# Patient Record
Sex: MALE | URBAN - METROPOLITAN AREA
[De-identification: names, ages, dates, MRNs, and addresses within clinical notes are randomized per-mention and may not be internally consistent; named-entity substitution may affect disease eponyms.]

---

## 2024-11-21 ENCOUNTER — EMERGENCY (EMERGENCY)
Facility: HOSPITAL | Age: 29
LOS: 0 days | Discharge: HOME | End: 2024-11-21
Attending: EMERGENCY MEDICINE
Payer: COMMERCIAL

## 2024-11-21 VITALS
TEMPERATURE: 99 F | RESPIRATION RATE: 18 BRPM | OXYGEN SATURATION: 100 % | DIASTOLIC BLOOD PRESSURE: 90 MMHG | WEIGHT: 148.59 LBS | HEART RATE: 94 BPM | SYSTOLIC BLOOD PRESSURE: 143 MMHG

## 2024-11-21 DIAGNOSIS — R06.02 SHORTNESS OF BREATH: ICD-10-CM

## 2024-11-21 DIAGNOSIS — F41.9 ANXIETY DISORDER, UNSPECIFIED: ICD-10-CM

## 2024-11-21 DIAGNOSIS — R00.2 PALPITATIONS: ICD-10-CM

## 2024-11-21 PROCEDURE — 99284 EMERGENCY DEPT VISIT MOD MDM: CPT

## 2024-11-21 PROCEDURE — 93010 ELECTROCARDIOGRAM REPORT: CPT

## 2024-11-21 PROCEDURE — 71046 X-RAY EXAM CHEST 2 VIEWS: CPT | Mod: 26

## 2024-11-21 PROCEDURE — 93005 ELECTROCARDIOGRAM TRACING: CPT

## 2024-11-21 PROCEDURE — 71046 X-RAY EXAM CHEST 2 VIEWS: CPT

## 2024-11-21 PROCEDURE — 99283 EMERGENCY DEPT VISIT LOW MDM: CPT | Mod: 25

## 2024-11-21 RX ORDER — HYDROXYZINE HCL 25 MG
1 TABLET ORAL
Qty: 42 | Refills: 0
Start: 2024-11-21 | End: 2024-12-11

## 2024-11-21 RX ORDER — HYDROXYZINE HCL 25 MG
25 TABLET ORAL ONCE
Refills: 0 | Status: COMPLETED | OUTPATIENT
Start: 2024-11-21 | End: 2024-11-21

## 2024-11-21 RX ADMIN — Medication 25 MILLIGRAM(S): at 12:31

## 2024-11-21 NOTE — ED ADULT TRIAGE NOTE - WEIGHT IN KG
Call placed to Lg to discuss need for ENT consult and scheduling of EGD.  Message left for Lg to return call.     67.4 IV discontinued, cath removed intact

## 2024-11-21 NOTE — ED PROVIDER NOTE - PATIENT PORTAL LINK FT
You can access the FollowMyHealth Patient Portal offered by [facility] by registering at the following website: [email or web address]. By joining [employer]’s FollowMyHealth portal, you will also be able to view your health information using other applications (apps) compatible with our system.

## 2024-11-21 NOTE — ED ADULT NURSE NOTE - OBJECTIVE STATEMENT
pt c/o feeling anxious and SOB, pt states he thinks he is having panic attacks, on and off x 1 week. as per Sister, family hx anxiety disorder. Denies SI/HI

## 2024-11-21 NOTE — ED PROVIDER NOTE - NSFOLLOWUPCLINICS_GEN_ALL_ED_FT
[facility]  Medicine  [address], [place]   Phone: [phone]  Fax:   Follow Up Time: 1-3 Days    [facility]  OP Mental Health  [address] [place] [address]  Phone: [phone]  Fax:   Follow Up Time: 1-3 Days

## 2024-11-21 NOTE — ED PROVIDER NOTE - CLINICAL SUMMARY MEDICAL DECISION MAKING FREE TEXT BOX
Patient presents with anxiety. SOB and palp during the episode which resolvd. normal ekg, cxr. Feeling better. Hydroxyzine given. Discharged with pmd and psych follow up. return precautions discussed.

## 2024-11-21 NOTE — ED PROVIDER NOTE - PHYSICAL EXAMINATION
GENERAL: Well-developed; well-nourished; in no acute distress.   SKIN: warm, dry  HEAD: Normocephalic; atraumatic.  EYES: 2mm pupils, PERRLA, EOMI, no conjunctival erythema  ENT: No nasal discharge; airway clear. MMM  NECK: Supple; non tender.  CARD: Regular rate and rhythm. S1, S2 normal; no murmurs, gallops, or rubs.   RESP: LCTAB; No wheezes, rales, rhonchi, or stridor.  ABD: soft, nontender, and nondistended  EXT: Normal ROM.  No LE TTP or edema bilaterally.  NEURO: A/ox3, grossly unremarkable  PSYCH: Cooperative, appropriate.

## 2024-11-21 NOTE — ED PROVIDER NOTE - NSFOLLOWUPINSTRUCTIONS_ED_ALL_ED_FT
Nuestros coordinadores de referencias del departamento de emergencias se comunicarán con usted en las próximas 24 a  48 horas de 9:00 a. m. a 5:00 p. m. (de lunes a viernes) con asad waleska de seguimiento. Espere asad llamada telefónica del hospital en baljit período de tiempo. Si no recibe asad llamada o si tiene alguna pregunta o inquietud, puede comunicarse con ellos al [phone].     Ansiedad    LO QUE NECESITA SABER:    ¿Qué necesito saber acerca de la ansiedad?La ansiedad es asad afección que provoca que usted se sienta extremadamente preocupado o nervioso. Los sentimientos son killian rita que pueden causar problemas en nataliya actividades diarias o el sueño. La ansiedad puede desencadenarse por algo que teme o puede ocurrir sin asad causa. El estrés familiar o laboral, fumar, la cafeína y el alcohol pueden aumentar morejon riesgo para sufrir ansiedad. Ciertos medicamentos o condiciones de edwin también pueden aumentar morejon riesgo. La ansiedad puede convertirse en asad afección de larga duración si no se controla ni se trata.    ¿Cuáles son los signos y síntomas de la ansiedad?    Fatiga o rigidez muscular    Temblores, inquietud o irritabilidad    Problemas para concentrarse    Dificultad para dormir    Sentirse asustadizo, azorado o mareado    Ritmo cardíaco acelerado o falta de aliento  ¿Cómo se diagnostica la ansiedad?Dígale a morejon médico cuándo comenzaron nataliya síntomas y qué los desencadena. Infórmele a morejon médico si la ansiedad afecta nataliya actividades diarias. Morejon médico también le preguntará sobre nataliya antecedentes médicos y si tiene familiares con asad condición similar. Informe a morejon médico acerca de morejon consumo actual y pasado de alcohol, nicotina o drogas. Cualquiera de estos puede empeorar la ansiedad.    ¿Cómo se trata la ansiedad?El tratamiento depende de la severidad de nataliya síntomas. Los siguientes son tratamientos comunes para la ansiedad:    La terapia cognitivo conductual (TCC)le enseña a identificar y cambiar patrones de pensamiento negativos.    Medicamentos para la ansiedad o antidepresivospueden ayudar a aliviar o prevenir la ansiedad. Es posible que usted deba lester los medicamentos por varias semanas antes de empezar a sentirse mejor. Informe a morejon médico sobre cualquier efecto secundario o problemas que usted presente con morejon medicamento. Podría ser necesario cambiar el tipo o cantidad de medicamento. Los medicamentos suelen utilizarse junto con la terapia.  ¿Qué puedo hacer para manejar mi ansiedad?    Hable con alguien sobre morejon ansiedad.Morejon médico puede sugerirle que reciba consejería. Usted podría sentirse más cómodo hablando con un familiar o amigo sobre morejon ansiedad. Elija a alguien que usted sepa que será comprensivo y alentador.    Realice actividad física regularmente.La actividad física puede reducir morejon estrés, mejorar morejon estado de ánimo y ayudarle a dormir mejor. Colabore con morejon médico para crear un plan de ejercicios que usted disfrute.  AALIYAH ASIÁTICA CAMINANDO JEFFERY EJERCICIO      Establezca un horario regular para dormir.Asad rutina puede ayudarlo a relajarse antes de irse a dormir. Escuche música, afia o stephen yoga. Trate de irse a dormir y despertarse al mismo tiempo todos los días. El sueño es importante para la edwin emocional.    Realice actividades que disfruta.Pase tiempo con amigos o stephen cosas divertidas. Elija actividades con las que esté familiarizado o que le resulten cómodas. [place] puede ayudar a prevenir la ansiedad.    Practique la respiración profunda.La respiración profunda puede ayudarlo a relajarse cuando se sienta ansioso. Enfóquese en respirar lento y profundo varias veces al día, o vilma un ataque de ansiedad. Respire por la nariz y exhale por la boca. La respiración profunda combinada con la meditación o escuchar música puede ayudarlo a sentirse más tranquilo.    No fume.La nicotina y otros químicos en los cigarrillos y cigarros pueden aumentar la ansiedad. Pida información a morejon médico si usted actualmente fuma y necesita ayuda para dejar de fumar. Los cigarrillos electrónicos o el tabaco sin humo igualmente contienen nicotina. Consulte con morejon médico antes de utilizar estos productos.    No consuma cafeína.La cafeína puede empeorar nataliya síntomas. No consuma alimentos o bebidas que tengan el propósito de aumentar morejon nivel de energía.    No tome alcohol ni use drogas.El alcohol y las drogas pueden empeorar la ansiedad o dificultar morejon control. Hable con morejon terapeuta o médico si necesita ayuda para dejar.  Consejos para el bienestar  Los siguientes recursos están disponibles en cualquier momento para ayudarlo, si es necesario:    Comuníquese con asad organización de prevención del suicidio:  Para la 988 Suicide and Crisis Lifeline (línea de estelle 988 contra el suicidio y la crisis):  Llame o envíe un mensaje de texto al 988    Envíe un mensaje de chat en [email or web address]/chat    Llame al [phone] (6-503-080-TALK)    Para la Suicide Hotline (línea de atención al suicida), llame al 1-217-487-0455 (0-647-NRFJIXB)    Para obtener asad lista de números internacionales: https://save.org/find-help/international-resources/  ¿Dónde puede encontrar más información o apoyo?    [employer] [place] [employer] Mental [employer]ness  [address]  [place], [place]  Phone: [phone]  Phone: [phone]  Web Address: http://www.pretty.org  988 Suicide and Crisis Lifeline  [address]  [place], [place]  Phone: 4-250-329  Web Address: http://www.suicidepreventionlifeline.org OR https://[employer]/chat/  Llame al número de emergencias local (911 en los [place]) si:    Usted tiene dolor de pecho, presión o pesadez que pueden llegar a propagarse a nataliya hombros, brazos, mandíbula, irma o espalda    Usted piensa en lastimarse o lastimar a alguien más.  ¿Cuándo jhon llamar a mi médico?    Nataliya síntomas empeoran o no mejoran con el tratamiento.    Morejon ansiedad moses que realice nataliya actividades diarias.    Tiene nuevos síntomas desde morejon última consulta.    Usted tiene preguntas o inquietudes acerca de morejon condición o cuidado.  ACUERDOS SOBRE MOREJON CUIDADO:    Usted tiene el derecho de ayudar a planear morejon cuidado. Aprenda todo lo que pueda sobre morejon condición y jeffery darle tratamiento. Discuta nataliya opciones de tratamiento con nataliya médicos para decidir el cuidado que usted desea recibir. Usted siempre tiene el derecho de rechazar el tratamiento.

## 2024-11-21 NOTE — ED PROVIDER NOTE - OBJECTIVE STATEMENT
28-year-old male, with no significant past medical history, presents to the ED for anxiety onset 2 days ago.  Patient describes he randomly and suddenly feels anxious with SOB and palpitations described as fast.  States this is never happened before.  Denies SI/HI, hallucinations, chest pain, recent illness, triggering events. Notes family history of anxiety and panic disorder. Never hospitalized before.

## 2024-11-21 NOTE — ED ADULT TRIAGE NOTE - CHIEF COMPLAINT QUOTE
C/o panic attack 2 days ago and having "a nervous breakdown." As per family "these nervous breakdowns are happening every 2 hours." Denies SI/HI, denies any auditory or visual hallucinations, both family and patient deny any aggressive behavior

## 2024-11-21 NOTE — ED ADULT NURSE NOTE - NSFALLUNIVINTERV_ED_ALL_ED
Bed/Stretcher in lowest position, wheels locked, appropriate side rails in place/Call bell, personal items and telephone in reach/Instruct patient to call for assistance before getting out of bed/chair/stretcher/Non-slip footwear applied when patient is off stretcher/[place] to call system/Physically safe environment - no spills, clutter or unnecessary equipment/Purposeful proactive rounding/Room/bathroom lighting operational, light cord in reach

## 2024-11-21 NOTE — ED PROVIDER NOTE - ATTENDING CONTRIBUTION TO CARE
[id number]    28-year-old male no past med history presents with a anxiety attack.  Patient reports for the last few weeks he has been having worsening of his anxiety.  Had an episode this morning.  Reports feeling palpitation shortness of breath during the episode.  More comfortable now.  No fevers recent illness.  No suicidal homicidal ideations.  No hallucinations.  No similar episodes in the past.    CONSTITUTIONAL: Well-developed; well-nourished; in no acute distress.   SKIN: warm, dry  HEAD: Normocephalic; atraumatic.  EYES: PERRL, EOMI, no conjunctival erythema  ENT: No nasal discharge; airway clear.  NECK: Supple; non tender.  CARD: S1, S2 normal;  Regular rate and rhythm.   RESP: No wheezes, rales or rhonchi.  EXT: Normal ROM.  5/5 strength in all 4 extremities   LYMPH: No acute cervical adenopathy.  NEURO: Alert, oriented, grossly unremarkable. neurovascularly intact  PSYCH: Cooperative, appropriate. no SI/HI. no hallucinations.

## 2024-11-25 NOTE — CHART NOTE - NSCHARTNOTEFT_GEN_A_CORE
Number unable to receive calls, no other number listed 11/22 - DK / Still unable to receive calls 11/25 - DK (PCP Referral)

## 2025-04-17 ENCOUNTER — EMERGENCY (EMERGENCY)
Facility: HOSPITAL | Age: 30
LOS: 0 days | Discharge: HOME | End: 2025-04-17
Attending: EMERGENCY MEDICINE
Payer: COMMERCIAL

## 2025-04-17 VITALS
HEART RATE: 78 BPM | RESPIRATION RATE: 18 BRPM | WEIGHT: 154.1 LBS | TEMPERATURE: 98 F | DIASTOLIC BLOOD PRESSURE: 74 MMHG | OXYGEN SATURATION: 99 % | SYSTOLIC BLOOD PRESSURE: 125 MMHG

## 2025-04-17 DIAGNOSIS — R30.0 DYSURIA: ICD-10-CM

## 2025-04-17 LAB
APPEARANCE UR: CLEAR — SIGNIFICANT CHANGE UP
BILIRUB UR-MCNC: NEGATIVE — SIGNIFICANT CHANGE UP
COLOR SPEC: YELLOW — SIGNIFICANT CHANGE UP
DIFF PNL FLD: NEGATIVE — SIGNIFICANT CHANGE UP
GLUCOSE UR QL: NEGATIVE MG/DL — SIGNIFICANT CHANGE UP
KETONES UR-MCNC: NEGATIVE MG/DL — SIGNIFICANT CHANGE UP
LEUKOCYTE ESTERASE UR-ACNC: NEGATIVE — SIGNIFICANT CHANGE UP
NITRITE UR-MCNC: NEGATIVE — SIGNIFICANT CHANGE UP
PH UR: 8.5 (ref 5–8)
PROT UR-MCNC: SIGNIFICANT CHANGE UP MG/DL
SP GR SPEC: 1.01 — SIGNIFICANT CHANGE UP (ref 1–1.03)
UROBILINOGEN FLD QL: 0.2 MG/DL — SIGNIFICANT CHANGE UP (ref 0.2–1)

## 2025-04-17 PROCEDURE — 87491 CHLMYD TRACH DNA AMP PROBE: CPT

## 2025-04-17 PROCEDURE — 99283 EMERGENCY DEPT VISIT LOW MDM: CPT

## 2025-04-17 PROCEDURE — 81003 URINALYSIS AUTO W/O SCOPE: CPT

## 2025-04-17 PROCEDURE — 87591 N.GONORRHOEAE DNA AMP PROB: CPT

## 2025-04-17 NOTE — ED ADULT TRIAGE NOTE - ARRIVAL FROM
Home Nosebleeds Normal Treatment: I explained this is common when taking isotretinoin. I recommended saline mist in each nostril multiple times a day. If this worsens they will contact us.

## 2025-04-17 NOTE — ED PROVIDER NOTE - CLINICAL SUMMARY MEDICAL DECISION MAKING FREE TEXT BOX
29-year-old male present to the ED for urinary symptoms.  Dysuria noted.  Denies any fevers, chills, nausea, vomiting just burning symptoms.  Denies any sexual contact except with his partner.  UA unremarkable.  Negative for nitrates and leuk esterase.  Sent off chlamydia and gonorrhea testing.  Currently low suspicion for STI.  Discharged home.

## 2025-04-17 NOTE — ED PROVIDER NOTE - PATIENT PORTAL LINK FT
You can access the FollowMyHealth Patient Portal offered by [facility] by registering at the following website: [email or web address]. By joining [employer]’s FollowMyHealth portal, you will also be able to view your health information using other applications (apps) compatible with our system.

## 2025-04-17 NOTE — ED PROVIDER NOTE - PHYSICAL EXAMINATION
VITAL SIGNS: I have reviewed nursing notes and confirm.  CONSTITUTIONAL: Well-appearing, non-toxic, in NAD  SKIN: Warm dry, normal skin turgor  CARD: RRR, no murmurs, rubs or gallops  RESP: Clear to ausculation bilaterally.  No rales, rhonchi, or wheezing.  ABD: Minimally tender to palpation suprapubic area

## 2025-04-17 NOTE — ED PROVIDER NOTE - OBJECTIVE STATEMENT
ID : [id number]. 29-year-old male no past medical history presenting to the ED for dysuria.  Patient states for the past 3 days he has been having burning when he urinates.  Patient states he has never had the symptoms before.  Patient is sexually active with wife. Denies history of STIs.  Denies fever, chills, nausea, vomiting

## 2025-04-18 PROBLEM — Z78.9 OTHER SPECIFIED HEALTH STATUS: Chronic | Status: ACTIVE | Noted: 2024-11-21

## 2025-04-18 LAB
C TRACH RRNA SPEC QL NAA+PROBE: SIGNIFICANT CHANGE UP
N GONORRHOEA RRNA SPEC QL NAA+PROBE: SIGNIFICANT CHANGE UP
SPECIMEN SOURCE: SIGNIFICANT CHANGE UP